# Patient Record
Sex: FEMALE | Race: WHITE | HISPANIC OR LATINO | URBAN - METROPOLITAN AREA
[De-identification: names, ages, dates, MRNs, and addresses within clinical notes are randomized per-mention and may not be internally consistent; named-entity substitution may affect disease eponyms.]

---

## 2024-07-19 ENCOUNTER — OUTPATIENT (OUTPATIENT)
Dept: OUTPATIENT SERVICES | Facility: HOSPITAL | Age: 34
LOS: 1 days | End: 2024-07-19
Payer: MEDICAID

## 2024-07-19 DIAGNOSIS — O09.899 SUPERVISION OF OTHER HIGH RISK PREGNANCIES, UNSPECIFIED TRIMESTER: ICD-10-CM

## 2024-07-19 LAB
HCT VFR BLD CALC: 39.3 % — SIGNIFICANT CHANGE UP (ref 34.5–45)
HGB BLD-MCNC: 13 G/DL — SIGNIFICANT CHANGE UP (ref 11.5–15.5)
MCHC RBC-ENTMCNC: 29.9 PG — SIGNIFICANT CHANGE UP (ref 27–34)
MCHC RBC-ENTMCNC: 33.1 GM/DL — SIGNIFICANT CHANGE UP (ref 32–36)
MCV RBC AUTO: 90.3 FL — SIGNIFICANT CHANGE UP (ref 80–100)
PLATELET # BLD AUTO: 266 K/UL — SIGNIFICANT CHANGE UP (ref 150–400)
RBC # BLD: 4.35 M/UL — SIGNIFICANT CHANGE UP (ref 3.8–5.2)
RBC # FLD: 11.9 % — SIGNIFICANT CHANGE UP (ref 10.3–14.5)
T4 FREE SERPL-MCNC: 1.3 NG/DL — SIGNIFICANT CHANGE UP (ref 0.9–1.8)
TSH RECEP AB FLD-ACNC: 1.13 IU/L — SIGNIFICANT CHANGE UP
TSH SERPL-MCNC: 0.02 UIU/ML — LOW (ref 0.27–4.2)
WBC # BLD: 11.82 K/UL — HIGH (ref 3.8–10.5)
WBC # FLD AUTO: 11.82 K/UL — HIGH (ref 3.8–10.5)

## 2024-07-19 PROCEDURE — 81003 URINALYSIS AUTO W/O SCOPE: CPT

## 2024-07-19 PROCEDURE — 87653 STREP B DNA AMP PROBE: CPT

## 2024-07-19 PROCEDURE — 86780 TREPONEMA PALLIDUM: CPT

## 2024-07-19 PROCEDURE — 84439 ASSAY OF FREE THYROXINE: CPT

## 2024-07-19 PROCEDURE — 83520 IMMUNOASSAY QUANT NOS NONAB: CPT

## 2024-07-19 PROCEDURE — G0463: CPT

## 2024-07-19 PROCEDURE — 84443 ASSAY THYROID STIM HORMONE: CPT

## 2024-07-19 PROCEDURE — 84445 ASSAY OF TSI GLOBULIN: CPT

## 2024-07-19 PROCEDURE — 36415 COLL VENOUS BLD VENIPUNCTURE: CPT

## 2024-07-19 PROCEDURE — 87389 HIV-1 AG W/HIV-1&-2 AB AG IA: CPT

## 2024-07-19 PROCEDURE — 85027 COMPLETE CBC AUTOMATED: CPT

## 2024-07-20 LAB
GROUP B BETA STREP DNA (PCR): DETECTED
HIV 1+2 AB+HIV1 P24 AG SERPL QL IA: SIGNIFICANT CHANGE UP
SOURCE GROUP B STREP: SIGNIFICANT CHANGE UP

## 2024-07-21 LAB
T PALLIDUM AB TITR SER: NEGATIVE — SIGNIFICANT CHANGE UP
TSI ACT/NOR SER: 0.73 IU/L — HIGH (ref 0–0.55)

## 2024-08-02 ENCOUNTER — OUTPATIENT (OUTPATIENT)
Dept: OUTPATIENT SERVICES | Facility: HOSPITAL | Age: 34
LOS: 1 days | End: 2024-08-02
Payer: MEDICAID

## 2024-08-02 DIAGNOSIS — O99.891 OTHER SPECIFIED DISEASES AND CONDITIONS COMPLICATING PREGNANCY: ICD-10-CM

## 2024-08-02 DIAGNOSIS — O09.90 SUPERVISION OF HIGH RISK PREGNANCY, UNSPECIFIED, UNSPECIFIED TRIMESTER: ICD-10-CM

## 2024-08-02 DIAGNOSIS — O09.899 SUPERVISION OF OTHER HIGH RISK PREGNANCIES, UNSPECIFIED TRIMESTER: ICD-10-CM

## 2024-08-02 DIAGNOSIS — O10.919 UNSPECIFIED PRE-EXISTING HYPERTENSION COMPLICATING PREGNANCY, UNSPECIFIED TRIMESTER: ICD-10-CM

## 2024-08-02 DIAGNOSIS — O35.8XX0 MATERNAL CARE FOR OTHER (SUSPECTED) FETAL ABNORMALITY AND DAMAGE, NOT APPLICABLE OR UNSPECIFIED: ICD-10-CM

## 2024-08-02 PROCEDURE — G0463: CPT

## 2024-08-02 PROCEDURE — 81003 URINALYSIS AUTO W/O SCOPE: CPT

## 2024-08-04 ENCOUNTER — OUTPATIENT (OUTPATIENT)
Dept: OUTPATIENT SERVICES | Facility: HOSPITAL | Age: 34
LOS: 1 days | End: 2024-08-04
Payer: MEDICAID

## 2024-08-04 DIAGNOSIS — O26.899 OTHER SPECIFIED PREGNANCY RELATED CONDITIONS, UNSPECIFIED TRIMESTER: ICD-10-CM

## 2024-08-04 PROCEDURE — G0463: CPT

## 2024-08-04 PROCEDURE — 83986 ASSAY PH BODY FLUID NOS: CPT

## 2024-08-04 PROCEDURE — 99202 OFFICE O/P NEW SF 15 MIN: CPT | Mod: 25,GC

## 2024-08-05 ENCOUNTER — INPATIENT (INPATIENT)
Facility: HOSPITAL | Age: 34
LOS: 1 days | Discharge: HOME | End: 2024-08-07
Attending: OBSTETRICS & GYNECOLOGY | Admitting: OBSTETRICS & GYNECOLOGY
Payer: MEDICAID

## 2024-08-05 VITALS — OXYGEN SATURATION: 97 % | HEART RATE: 75 BPM | DIASTOLIC BLOOD PRESSURE: 67 MMHG | SYSTOLIC BLOOD PRESSURE: 120 MMHG

## 2024-08-05 VITALS — SYSTOLIC BLOOD PRESSURE: 132 MMHG | DIASTOLIC BLOOD PRESSURE: 75 MMHG | HEART RATE: 75 BPM

## 2024-08-05 VITALS — SYSTOLIC BLOOD PRESSURE: 116 MMHG | DIASTOLIC BLOOD PRESSURE: 62 MMHG

## 2024-08-05 DIAGNOSIS — Z34.80 ENCOUNTER FOR SUPERVISION OF OTHER NORMAL PREGNANCY, UNSPECIFIED TRIMESTER: ICD-10-CM

## 2024-08-05 DIAGNOSIS — O26.899 OTHER SPECIFIED PREGNANCY RELATED CONDITIONS, UNSPECIFIED TRIMESTER: ICD-10-CM

## 2024-08-05 LAB
BASOPHILS # BLD AUTO: 0.03 K/UL — SIGNIFICANT CHANGE UP (ref 0–0.2)
BASOPHILS NFR BLD AUTO: 0.3 % — SIGNIFICANT CHANGE UP (ref 0–2)
BLD GP AB SCN SERPL QL: NEGATIVE — SIGNIFICANT CHANGE UP
EOSINOPHIL # BLD AUTO: 0.04 K/UL — SIGNIFICANT CHANGE UP (ref 0–0.5)
EOSINOPHIL NFR BLD AUTO: 0.4 % — SIGNIFICANT CHANGE UP (ref 0–6)
GLUCOSE BLDC GLUCOMTR-MCNC: 106 MG/DL — HIGH (ref 70–99)
GLUCOSE BLDC GLUCOMTR-MCNC: 81 MG/DL — SIGNIFICANT CHANGE UP (ref 70–99)
HCT VFR BLD CALC: 35.1 % — SIGNIFICANT CHANGE UP (ref 34.5–45)
HGB BLD-MCNC: 12.3 G/DL — SIGNIFICANT CHANGE UP (ref 11.5–15.5)
IMM GRANULOCYTES NFR BLD AUTO: 0.5 % — SIGNIFICANT CHANGE UP (ref 0–0.9)
LYMPHOCYTES # BLD AUTO: 2.33 K/UL — SIGNIFICANT CHANGE UP (ref 1–3.3)
LYMPHOCYTES # BLD AUTO: 24.7 % — SIGNIFICANT CHANGE UP (ref 13–44)
MCHC RBC-ENTMCNC: 30.4 PG — SIGNIFICANT CHANGE UP (ref 27–34)
MCHC RBC-ENTMCNC: 35 GM/DL — SIGNIFICANT CHANGE UP (ref 32–36)
MCV RBC AUTO: 86.7 FL — SIGNIFICANT CHANGE UP (ref 80–100)
MONOCYTES # BLD AUTO: 0.66 K/UL — SIGNIFICANT CHANGE UP (ref 0–0.9)
MONOCYTES NFR BLD AUTO: 7 % — SIGNIFICANT CHANGE UP (ref 2–14)
NEUTROPHILS # BLD AUTO: 6.32 K/UL — SIGNIFICANT CHANGE UP (ref 1.8–7.4)
NEUTROPHILS NFR BLD AUTO: 67.1 % — SIGNIFICANT CHANGE UP (ref 43–77)
NRBC # BLD: 0 /100 WBCS — SIGNIFICANT CHANGE UP (ref 0–0)
PLATELET # BLD AUTO: 212 K/UL — SIGNIFICANT CHANGE UP (ref 150–400)
RBC # BLD: 4.05 M/UL — SIGNIFICANT CHANGE UP (ref 3.8–5.2)
RBC # FLD: 11.9 % — SIGNIFICANT CHANGE UP (ref 10.3–14.5)
RH IG SCN BLD-IMP: POSITIVE — SIGNIFICANT CHANGE UP
WBC # BLD: 9.43 K/UL — SIGNIFICANT CHANGE UP (ref 3.8–10.5)
WBC # FLD AUTO: 9.43 K/UL — SIGNIFICANT CHANGE UP (ref 3.8–10.5)

## 2024-08-05 PROCEDURE — 99223 1ST HOSP IP/OBS HIGH 75: CPT

## 2024-08-05 RX ORDER — DEXTROSE MONOHYDRATE, SODIUM CHLORIDE, SODIUM LACTATE, CALCIUM CHLORIDE, MAGNESIUM CHLORIDE 1.5; 538; 448; 18.4; 5.08 G/100ML; MG/100ML; MG/100ML; MG/100ML; MG/100ML
1000 SOLUTION INTRAPERITONEAL
Refills: 0 | Status: DISCONTINUED | OUTPATIENT
Start: 2024-08-05 | End: 2024-08-06

## 2024-08-05 RX ORDER — AMPICILLIN 1 G/1
1 INJECTION, POWDER, FOR SOLUTION INTRAMUSCULAR; INTRAVENOUS EVERY 4 HOURS
Refills: 0 | Status: DISCONTINUED | OUTPATIENT
Start: 2024-08-05 | End: 2024-08-06

## 2024-08-05 RX ORDER — OXYTOCIN/RINGER'S LACTATE 20/1000 ML
333.33 PLASTIC BAG, INJECTION (ML) INTRAVENOUS
Qty: 20 | Refills: 0 | Status: COMPLETED | OUTPATIENT
Start: 2024-08-05 | End: 2024-08-06

## 2024-08-05 RX ORDER — AMPICILLIN 1 G/1
2 INJECTION, POWDER, FOR SOLUTION INTRAMUSCULAR; INTRAVENOUS ONCE
Refills: 0 | Status: COMPLETED | OUTPATIENT
Start: 2024-08-05 | End: 2024-08-05

## 2024-08-05 RX ORDER — TRISODIUM CITRATE DIHYDRATE AND CITRIC ACID MONOHYDRATE 500; 334 MG/5ML; MG/5ML
15 SOLUTION ORAL EVERY 6 HOURS
Refills: 0 | Status: DISCONTINUED | OUTPATIENT
Start: 2024-08-05 | End: 2024-08-06

## 2024-08-05 RX ORDER — BACTERIOSTATIC SODIUM CHLORIDE 0.9 %
1000 VIAL (ML) INJECTION
Refills: 0 | Status: DISCONTINUED | OUTPATIENT
Start: 2024-08-05 | End: 2024-08-06

## 2024-08-05 RX ORDER — CHLORHEXIDINE GLUCONATE 500 MG/1
1 CLOTH TOPICAL DAILY
Refills: 0 | Status: DISCONTINUED | OUTPATIENT
Start: 2024-08-05 | End: 2024-08-06

## 2024-08-05 RX ADMIN — DEXTROSE MONOHYDRATE, SODIUM CHLORIDE, SODIUM LACTATE, CALCIUM CHLORIDE, MAGNESIUM CHLORIDE 125 MILLILITER(S): 1.5; 538; 448; 18.4; 5.08 SOLUTION INTRAPERITONEAL at 23:40

## 2024-08-05 RX ADMIN — AMPICILLIN 200 GRAM(S): 1 INJECTION, POWDER, FOR SOLUTION INTRAMUSCULAR; INTRAVENOUS at 20:18

## 2024-08-05 NOTE — OB PROVIDER TRIAGE NOTE - NSHPPHYSICALEXAM_GEN_ALL_CORE
T(C): 36.9 (08-05-24 @ 00:13), Max: 36.9 (08-05-24 @ 00:13)  HR: 75 (08-05-24 @ 01:14) (74 - 88)  BP: 120/67 (08-05-24 @ 01:14) (120/67 - 132/75)  RR: 18 (08-05-24 @ 00:13) (18 - 18)  SpO2: 97% (08-05-24 @ 01:14) (97% - 100%)  Gen: NAD, well-appearing   Abd: Soft, gravid  SVE: 2/70/-3, -pooling, -nitrazine, -ferning   Bedside sono: cephalic, ALEJANDRO 11 cm  FHT: baseline 135, moderate variability, +accels, -decels  [place]: ctx q8-10 min    Exam performed by Dr. Leon, PGY-3

## 2024-08-05 NOTE — OB PROVIDER H&P - NS ATTEND AMEND GEN_ALL_CORE FT
ATTG on service note    Pt interviewed at the bedside.   chart reviewed.    Pt is a 33 yo  @ 38.3 wks presents back in triage for worsening ctxs.  Pt seen earlier for r/o ROM/ROL and found to be intact and 2cm so pt dc to home.  Pain worsened throughout the course of the day.  Intact.    PNC-AMA on bASA, h/o  @ 21 wks for PPROM, Hyperthyroidism s/p PTU/Methamizaole w/ nl TFTs, GDMA1    FS-106  T(C): 36.8 (24 @ 20:39), Max: 36.9 (24 @ 00:13)  HR: 65 (24 @ 21:43) (65 - 94)  BP: 113/67 (24 @ 21:43) (109/64 - 132/75)  RR: 15 (24 @ 20:39) (15 - 18)  SpO2: 100% (24 @ 21:43) (92% - 100%)    VE-5/80/-2  FHT-baseline 135, moderate variability, no decels, +accels  toco-q 6 min  EFW-508115 gms (extrapolated from u/s on  of 2941 gms (64%)  Pelvis tested to 7lbs 3 oz  GBS pos    Labs   CBC-9.4/12.3/35.1/212  Hep B sAg- nr  HIV - nr  MBT-O+    a/p:33 yo  @ 38.3 wks presents for painful ctxs and has made cervical change.  Cat 1 FHT.  GBS pos.  h/o GDMA1 and Hyperthyroid - no meds.  AGA.  -admit to LnD for expectant mngt   -h/p GDMA1 - FS q 4h and then q2h in active labor with rotating fluids  -epidural for pain mngt  -GBS pos - start Amp  -moderate PPH risk for AMA  -SCD for DVT ppx  -girl fetus  -labs reviewed and no further action required at this time  -Depo for contraception  -anticipate   -h/o Hyperthyroidism - no meds - Endo f/u pp in Oct    DUC Campbell

## 2024-08-05 NOTE — OB PROVIDER H&P - ASSESSMENT
33yo  @ 38w3d KAR  here with worsening contraction pain, found to be in labor, made cervical change from 2cm to 4-5cm/80/-2. Fetal status reassuring with Cat I tracing. GBS positive, will need prophylaxis. Maternal vitals stable. Will admit for labor management.     Maternal   - Admit to L&D  - Admission labs: CBC, RPR, T&S  - for expectant management  - anesthesia consult prn for epidural placement   - elevated PPH risk due to ama  - desires Depo provera for PP contraception     GDMA1  - NPO, diabetic IVF  - FS q4h in latent labor, q1h while in active labor  - insulin GTT prn elevated FS    Fetal  - cEFM/toco, currently Cat I   - ampicillin for GBS prophylaxis     Discussed with Dr. Campbell

## 2024-08-05 NOTE — OB RN TRIAGE NOTE - COMFORT/ACCEPTABLE PAIN LEVEL (0-10)
2 Erythromycin Counseling:  I discussed with the patient the risks of erythromycin including but not limited to GI upset, allergic reaction, drug rash, diarrhea, increase in liver enzymes, and yeast infections.

## 2024-08-05 NOTE — OB PROVIDER H&P - NSPRENATALGBS_OBGYN_ALL_OB_GET_DAYS
Patient needs a refill for Lisinopril, Flomax  Last RX 3/4/2022 QTY 90 with 3 additional refills  Was told to return to clinic in 1 year  Refilled RX x 4    
0

## 2024-08-05 NOTE — OB PROVIDER H&P - HISTORY OF PRESENT ILLNESS
OB Admission H&P    33yo  @ 38w3d, KAR  presents c/o worsening contraction pain. She was seen this AM and was found to be 70 and discharged home. Since pt with worsening pain. Denies LOF or VB. +FM. Last EFW 6lbs 8oz on .       PNC: HRC  AP Issues:  1-GDMA1  - FS well controlled  - growth as per above  2-poor OBHx  -  21w  i/s/o PPROM   3-GBS positive, NKDA    OBHx:    FT , M, 7lbs 3oz, no complications   PT  following IOL for PPROM, no complications   GynHx: denies abnormal Paps, STIs, cysts, fibroids   PMH: Graves Disease (follows with endo, was on methimazole 15mg, d/c'd due to normal TFTs, will f/up with endo PP)   PSH: denies  Meds: PNV, bASA  Allergies: NKDA  Social: denies alcohol/tobacco/drug use in pregnancy   Psych: denies anxiety/depression     Will accept blood transfusions: Yes

## 2024-08-05 NOTE — OB PROVIDER TRIAGE NOTE - ATTENDING COMMENTS
ATTG note    Pt seen at the bedside.      33 yo P1 @ 38.3 wks for r/o ROM/ROL.  Reports LOF x 2 at 10:30 pm and then abd pain.  VSS  EXam - no signs of ROM, neg pooling, neg nitrazine, ALEJANDRO - 11   VE-2/70  NST-reactive  toco-irreg    -NOt requesting pain meds.  Offered prolonged monitoring and rpt VE.  Pt declined.  No indication for IOL.   -Clinically stable to dc to home    DUC Campbell

## 2024-08-05 NOTE — OB RN TRIAGE NOTE - NSMATERNALFETALCONCERNS_OBGYN_ALL_OB_FT
Maternal Alert  This patient participates in the [employer] EFFECT study (PI Dr. Mcmahon). Please notify the MFM fellow (days) or night float OB resident (nights) upon admission for delivery.

## 2024-08-05 NOTE — PRE-ANESTHESIA EVALUATION ADULT - NSANTHNECKRD_ENT_A_CORE
Anesthesia Evaluation     Patient summary reviewed and Nursing notes reviewed   NPO Solid Status: > 8 hours  NPO Liquid Status: > 2 hours           Airway   Mallampati: II  TM distance: >3 FB  Neck ROM: full  Dental - normal exam     Pulmonary - normal exam   (+) a smoker Former,   Cardiovascular - normal exam        Neuro/Psych  (+) numbness,     GI/Hepatic/Renal/Endo      Musculoskeletal     (+) back pain, radiculopathy  Abdominal    Substance History      OB/GYN          Other                        Anesthesia Plan    ASA 2     MAC     Anesthetic plan, all risks, benefits, and alternatives have been provided, discussed and informed consent has been obtained with: patient.      
No
Yesica Moses(Attending)

## 2024-08-05 NOTE — OB PROVIDER TRIAGE NOTE - NSOBPROVIDERNOTE_OBGYN_ALL_OB_FT
A: 34y  at 38.3weeks GA presents to L&D for rule out rupture and rule out labor. Pt is not ruptured, -pooling, -nitrazine, -ferning. ALEJANDRO 11. CTX q8-10 min irregular. Exam /-3. Pt stable.       P:  -Pt is not ruptured  -Labor: Intact. Latent labor. Oren q8-10 min.  -Fetus: Cat I tracing.  -Offered to repeat SVE in 2 hours or send pt home now. Pt elects to go home.  -Return precautions discussed    Pt seen with Dr. Leon, PGY-3. Discussed with Dr. Campbell, attending.    Ruma Alvarez, PGY-1

## 2024-08-05 NOTE — OB PROVIDER H&P - NSHPPHYSICALEXAM_GEN_ALL_CORE
Vital Signs Last 24 Hrs  T(C): 36.8 (05 Aug 2024 19:10), Max: 36.9 (05 Aug 2024 00:13)  T(F): 98.2 (05 Aug 2024 19:10), Max: 98.4 (05 Aug 2024 00:13)  HR: 89 (05 Aug 2024 20:13) (74 - 94)  BP: 131/80 (05 Aug 2024 20:09) (116/62 - 132/75)  RR: 16 (05 Aug 2024 19:10) (15 - 18)  SpO2: 97% (05 Aug 2024 20:13) (92% - 100%)    Gen: alert, NAD  Abd: gravid, soft, non-tender  Ext: wwp, no LE edema or pain bilaterally    SVE: 4-5/80/-2    EFM: baseline 130, mod variability, +accels, no decels  [place]: irregular contractions  BSUS: vertex  EFW: 3400g extrapolated from last US

## 2024-08-05 NOTE — OB RN PATIENT PROFILE - FALL HARM RISK - UNIVERSAL INTERVENTIONS
Bed in lowest position, wheels locked, appropriate side rails in place/Call bell, personal items and telephone in reach/Instruct patient to call for assistance before getting out of bed or chair/Non-slip footwear when patient is out of bed/[place] to call system/Physically safe environment - no spills, clutter or unnecessary equipment/Purposeful Proactive Rounding/Room/bathroom lighting operational, light cord in reach

## 2024-08-05 NOTE — OB RN TRIAGE NOTE - FALL HARM RISK - UNIVERSAL INTERVENTIONS
Bed in lowest position, wheels locked, appropriate side rails in place/Call bell, personal items and telephone in reach/Instruct patient to call for assistance before getting out of bed or chair/Non-slip footwear when patient is out of bed/[place] to call system/Physically safe environment - no spills, clutter or unnecessary equipment/Purposeful Proactive Rounding/Room/bathroom lighting operational, light cord in reach

## 2024-08-05 NOTE — OB PROVIDER TRIAGE NOTE - HISTORY OF PRESENT ILLNESS
34y  at 38.3 weeks GA presents to L&D for rule out rupture and rule out labor. Patient states she felt trickling of clear fluid at 10:30pm (2 hours ago) when she went to the bathroom. It happened again at 11pm. Neither instance was a large gush of fluid. Patient also c/o pain/contractions that began at 11pm every 8-10 minutes. Patient denies vaginal bleeding. She endorses good fetal movement. Denies fevers, chills, nausea and vomiting. No other complaints at this time. Prenatal course is significant for: AMA, GDMA1.    Prenatal course uncomplicated.    KAR: 2024    POB: AMA,   FT 7 lb 3 oz,  PPROM 20w IUFD  PGYN: intrauterine synechiae, denies fibroids, ovarian cysts, STD hx, or abnormal PAPs   PMH: hyperthyroidism/Grave's disease (not currently on meds, previously on PTU 15mg stopped 4 months ago, asymptomatic)  PSH: Denies  SH: Denies EtOH, tobacco and illicit drug use during this pregnancy; feels safe at home   Psych Hx: denies hx of anxiety or depression  Meds: PNVs, ASA 81  Allergies: NKDA    EFW: 3400    GBS: positive

## 2024-08-05 NOTE — OB RN TRIAGE NOTE - PATIENT ON (OXYGEN DELIVERY METHOD)
Continue Regimen: .\\nSun protection daily \\n\\nMild Cleanser at night. \\nSmoothing Retinol 2X at night. Olu Cea Detail Level: Zone Continue Regimen: .\\nBiotin 20.000 mcg daily, every other month. \\nRotate shampoos. \\n- Ketoconazole 2% shampoo twice weekly \\n-Regular Shampoo \\n\\nClobetasol 0.05 % scalp solution. Use it once every 2 weeks. room air

## 2024-08-06 DIAGNOSIS — O09.213 SUPERVISION OF PREGNANCY WITH HISTORY OF PRE-TERM LABOR, THIRD TRIMESTER: ICD-10-CM

## 2024-08-06 DIAGNOSIS — O24.410 GESTATIONAL DIABETES MELLITUS IN PREGNANCY, DIET CONTROLLED: ICD-10-CM

## 2024-08-06 DIAGNOSIS — E05.90 THYROTOXICOSIS, UNSPECIFIED WITHOUT THYROTOXIC CRISIS OR STORM: ICD-10-CM

## 2024-08-06 DIAGNOSIS — Z03.71 ENCOUNTER FOR SUSPECTED PROBLEM WITH AMNIOTIC CAVITY AND MEMBRANE RULED OUT: ICD-10-CM

## 2024-08-06 DIAGNOSIS — O47.1 FALSE LABOR AT OR AFTER 37 COMPLETED WEEKS OF GESTATION: ICD-10-CM

## 2024-08-06 DIAGNOSIS — O09.523 SUPERVISION OF ELDERLY MULTIGRAVIDA, THIRD TRIMESTER: ICD-10-CM

## 2024-08-06 DIAGNOSIS — O99.283 ENDOCRINE, NUTRITIONAL AND METABOLIC DISEASES COMPLICATING PREGNANCY, THIRD TRIMESTER: ICD-10-CM

## 2024-08-06 DIAGNOSIS — Z3A.38 38 WEEKS GESTATION OF PREGNANCY: ICD-10-CM

## 2024-08-06 DIAGNOSIS — O09.293 SUPERVISION OF PREGNANCY WITH OTHER POOR REPRODUCTIVE OR OBSTETRIC HISTORY, THIRD TRIMESTER: ICD-10-CM

## 2024-08-06 LAB
GLUCOSE BLDC GLUCOMTR-MCNC: 74 MG/DL — SIGNIFICANT CHANGE UP (ref 70–99)
T PALLIDUM AB TITR SER: NEGATIVE — SIGNIFICANT CHANGE UP

## 2024-08-06 PROCEDURE — 59409 OBSTETRICAL CARE: CPT | Mod: U9

## 2024-08-06 RX ORDER — ACETAMINOPHEN 500 MG
975 TABLET ORAL
Refills: 0 | Status: DISCONTINUED | OUTPATIENT
Start: 2024-08-06 | End: 2024-08-07

## 2024-08-06 RX ORDER — IBUPROFEN 200 MG
600 TABLET ORAL EVERY 6 HOURS
Refills: 0 | Status: COMPLETED | OUTPATIENT
Start: 2024-08-06 | End: 2025-07-05

## 2024-08-06 RX ORDER — SIMETHICONE 125 MG/1
80 TABLET, CHEWABLE ORAL EVERY 4 HOURS
Refills: 0 | Status: DISCONTINUED | OUTPATIENT
Start: 2024-08-06 | End: 2024-08-07

## 2024-08-06 RX ORDER — MAGNESIUM HYDROXIDE 400 MG/5ML
30 SUSPENSION, ORAL (FINAL DOSE FORM) ORAL
Refills: 0 | Status: DISCONTINUED | OUTPATIENT
Start: 2024-08-06 | End: 2024-08-07

## 2024-08-06 RX ORDER — DIBUCAINE 1 %
1 OINTMENT (GRAM) TOPICAL EVERY 6 HOURS
Refills: 0 | Status: DISCONTINUED | OUTPATIENT
Start: 2024-08-06 | End: 2024-08-07

## 2024-08-06 RX ORDER — OXYCODONE HYDROCHLORIDE 30 MG/1
5 TABLET ORAL
Refills: 0 | Status: DISCONTINUED | OUTPATIENT
Start: 2024-08-06 | End: 2024-08-07

## 2024-08-06 RX ORDER — LANOLIN 100 %
1 OINTMENT (GRAM) TOPICAL EVERY 6 HOURS
Refills: 0 | Status: DISCONTINUED | OUTPATIENT
Start: 2024-08-06 | End: 2024-08-07

## 2024-08-06 RX ORDER — HYDROCORTISONE 1 %
1 CREAM (GRAM) TOPICAL EVERY 6 HOURS
Refills: 0 | Status: DISCONTINUED | OUTPATIENT
Start: 2024-08-06 | End: 2024-08-07

## 2024-08-06 RX ORDER — IBUPROFEN 200 MG
600 TABLET ORAL EVERY 6 HOURS
Refills: 0 | Status: DISCONTINUED | OUTPATIENT
Start: 2024-08-06 | End: 2024-08-07

## 2024-08-06 RX ORDER — OXYTOCIN/RINGER'S LACTATE 20/1000 ML
41.67 PLASTIC BAG, INJECTION (ML) INTRAVENOUS
Qty: 20 | Refills: 0 | Status: DISCONTINUED | OUTPATIENT
Start: 2024-08-06 | End: 2024-08-07

## 2024-08-06 RX ORDER — KETOROLAC TROMETHAMINE 10 MG
30 TABLET ORAL ONCE
Refills: 0 | Status: DISCONTINUED | OUTPATIENT
Start: 2024-08-06 | End: 2024-08-07

## 2024-08-06 RX ORDER — CLOSTRIDIUM TETANI TOXOID ANTIGEN (FORMALDEHYDE INACTIVATED), CORYNEBACTERIUM DIPHTHERIAE TOXOID ANTIGEN (FORMALDEHYDE INACTIVATED), BORDETELLA PERTUSSIS TOXOID ANTIGEN (GLUTARALDEHYDE INACTIVATED), BORDETELLA PERTUSSIS FILAMENTOUS HEMAGGLUTININ ANTIGEN (FORMALDEHYDE INACTIVATED), BORDETELLA PERTUSSIS PERTACTIN ANTIGEN, AND BORDETELLA PERTUSSIS FIMBRIAE 2/3 ANTIGEN 5; 2; 2.5; 5; 3; 5 [LF]/.5ML; [LF]/.5ML; UG/.5ML; UG/.5ML; UG/.5ML; UG/.5ML
0.5 INJECTION, SUSPENSION INTRAMUSCULAR ONCE
Refills: 0 | Status: DISCONTINUED | OUTPATIENT
Start: 2024-08-06 | End: 2024-08-07

## 2024-08-06 RX ORDER — DIPHENHYDRAMINE HCL 25 MG
25 CAPSULE ORAL EVERY 6 HOURS
Refills: 0 | Status: DISCONTINUED | OUTPATIENT
Start: 2024-08-06 | End: 2024-08-07

## 2024-08-06 RX ORDER — BACTERIOSTATIC SODIUM CHLORIDE 0.9 %
3 VIAL (ML) INJECTION EVERY 8 HOURS
Refills: 0 | Status: DISCONTINUED | OUTPATIENT
Start: 2024-08-06 | End: 2024-08-07

## 2024-08-06 RX ORDER — DIPHENHYDRAMINE HCL 25 MG
25 CAPSULE ORAL EVERY 6 HOURS
Refills: 0 | Status: COMPLETED | OUTPATIENT
Start: 2024-08-06 | End: 2025-07-05

## 2024-08-06 RX ORDER — OXYTOCIN/RINGER'S LACTATE 20/1000 ML
10 PLASTIC BAG, INJECTION (ML) INTRAVENOUS ONCE
Refills: 0 | Status: COMPLETED | OUTPATIENT
Start: 2024-08-06 | End: 2024-08-06

## 2024-08-06 RX ORDER — OXYCODONE HYDROCHLORIDE 30 MG/1
5 TABLET ORAL ONCE
Refills: 0 | Status: DISCONTINUED | OUTPATIENT
Start: 2024-08-06 | End: 2024-08-07

## 2024-08-06 RX ORDER — WITCH HAZEL 500 MG/1
1 CLOTH TOPICAL EVERY 4 HOURS
Refills: 0 | Status: DISCONTINUED | OUTPATIENT
Start: 2024-08-06 | End: 2024-08-07

## 2024-08-06 RX ORDER — CRANBERRY FRUIT EXTRACT 650 MG
1 CAPSULE ORAL DAILY
Refills: 0 | Status: DISCONTINUED | OUTPATIENT
Start: 2024-08-06 | End: 2024-08-07

## 2024-08-06 RX ADMIN — Medication 600 MILLIGRAM(S): at 23:22

## 2024-08-06 RX ADMIN — Medication 1000 MILLIUNIT(S)/MIN: at 02:35

## 2024-08-06 RX ADMIN — Medication 1 TABLET(S): at 11:45

## 2024-08-06 RX ADMIN — Medication 600 MILLIGRAM(S): at 12:15

## 2024-08-06 RX ADMIN — Medication 975 MILLIGRAM(S): at 20:17

## 2024-08-06 RX ADMIN — Medication 25 MILLIGRAM(S): at 04:52

## 2024-08-06 RX ADMIN — Medication 975 MILLIGRAM(S): at 09:14

## 2024-08-06 RX ADMIN — Medication 10 UNIT(S): at 02:35

## 2024-08-06 RX ADMIN — Medication 975 MILLIGRAM(S): at 20:47

## 2024-08-06 RX ADMIN — Medication 600 MILLIGRAM(S): at 23:52

## 2024-08-06 RX ADMIN — Medication 975 MILLIGRAM(S): at 15:46

## 2024-08-06 RX ADMIN — Medication 975 MILLIGRAM(S): at 09:44

## 2024-08-06 RX ADMIN — Medication 600 MILLIGRAM(S): at 11:45

## 2024-08-06 RX ADMIN — AMPICILLIN 108 GRAM(S): 1 INJECTION, POWDER, FOR SOLUTION INTRAMUSCULAR; INTRAVENOUS at 00:10

## 2024-08-06 RX ADMIN — Medication 600 MILLIGRAM(S): at 17:25

## 2024-08-06 RX ADMIN — Medication 975 MILLIGRAM(S): at 16:16

## 2024-08-06 NOTE — OB RN DELIVERY SUMMARY - NSSELHIDDEN_OBGYN_ALL_OB_FT
[NS_DeliveryAttending1_OBGYN_ALL_OB_FT:[id number]],[NS_DeliveryAssist1_OBGYN_ALL_OB_FT:[id number]],[NS_DeliveryAssist2_OBGYN_ALL_OB_FT:[id number]=]

## 2024-08-06 NOTE — OB PROVIDER LABOR PROGRESS NOTE - NS_SUBJECTIVE/OBJECTIVE_OBGYN_ALL_OB_FT
Pt with rectal pressure.
S:  Pt seen and examined for irregular toco.    O: Pt comfortable in bed.  Vital Signs Last 24 Hrs  T(C): 36.5 (06 Aug 2024 00:12), Max: 36.8 (05 Aug 2024 19:01)  T(F): 97.7 (06 Aug 2024 00:12), Max: 98.24 (05 Aug 2024 19:01)  HR: 86 (06 Aug 2024 00:59) (64 - 94)  BP: 119/60 (06 Aug 2024 00:49) (98/56 - 131/80)  BP(mean): --  RR: 16 (06 Aug 2024 00:12) (15 - 18)  SpO2: 93% (06 Aug 2024 00:59) (90% - 100%)    Parameters below as of 05 Aug 2024 19:10  Patient On (Oxygen Delivery Method): room air
S: Pt seen and examined for cervical change.    O: Pt comfortable s/p epidural placement  Vital Signs Last 24 Hrs  T(C): 36.7 (05 Aug 2024 21:55), Max: 36.9 (05 Aug 2024 00:13)  T(F): 98.06 (05 Aug 2024 21:55), Max: 98.4 (05 Aug 2024 00:13)  HR: 81 (05 Aug 2024 23:33) (65 - 94)  BP: 98/56 (05 Aug 2024 23:33) (98/56 - 132/75)  BP(mean): --  RR: 18 (05 Aug 2024 21:55) (15 - 18)  SpO2: 99% (05 Aug 2024 23:33) (90% - 100%)    Parameters below as of 05 Aug 2024 19:10  Patient On (Oxygen Delivery Method): room air

## 2024-08-06 NOTE — OB PROVIDER DELIVERY SUMMARY - NSSELHIDDEN_OBGYN_ALL_OB_FT
[NS_DeliveryAttending1_OBGYN_ALL_OB_FT:[id number]],[NS_DeliveryAssist1_OBGYN_ALL_OB_FT:[id number]],[NS_DeliveryAssist2_OBGYN_ALL_OB_FT:[id number]=]

## 2024-08-06 NOTE — OB PROVIDER LABOR PROGRESS NOTE - ASSESSMENT
Pt is a 35yo  admitted for labor. VE exam unchanged.    - Reposition pt  - Continue cont EFM, toco, IVF  - Expect     Ruma Alvarez, PGY-1
A: 33yo  in active labor. Contractions have slowed. Cat I tracing.    P:  -AROM@11:30pm. Patient tolerated well, no complications.  -Epidural placed  -Position pt peanut ball  -Continue cont EFM, toco, IVF    Ruma Alvarez, PGY-1
P1 @ 38.3 wks in active labor  s/p AROM of clear fluid  Making cervical change.    -Cont expectant mngt   -Anticipate SARAH Campbell

## 2024-08-06 NOTE — OB RN DELIVERY SUMMARY - NS_SEPSISRSKCALC_OBGYN_ALL_OB_FT
EOS calculated successfully. EOS Risk Factor: 0.5/1000 live births ([facility] national incidence); GA=38w4d; Temp=98.24; ROM=3.017; GBS='Positive'; Antibiotics='GBS specific antibiotics > 2 hrs prior to birth'

## 2024-08-06 NOTE — OB RN DELIVERY SUMMARY - NSSKINTOSKINA_OBGYN_ALL_OB_END_DATE
Action Requested: Summary for Provider     []  Critical Lab, Recommendations Needed  [] Need Additional Advice  []   FYI    []   Need Orders  [] Need Medications Sent to Pharmacy  []  Other      call #[phone]    SUMMARY: Patient requesting appt t 06-Aug-2024 03:31

## 2024-08-06 NOTE — OB PROVIDER DELIVERY SUMMARY - NSLOWPPHRISK_OBGYN_A_OB
No previous uterine incision/[place] Pregnancy/Less than or equal to 4 previous vaginal births/No known bleeding disorder/No history of postpartum hemorrhage/No other PPH risks indicated

## 2024-08-06 NOTE — OB PROVIDER LABOR PROGRESS NOTE - NS_OBIHIFHRDETAILS_OBGYN_ALL_OB_FT
Baseline: 120 bpm, moderate variability,  + accels, - decels
baseline 120 bpm, moderate variability, +accels, -decels
Baseline 120, moderate variability, no decels + accels

## 2024-08-06 NOTE — OB PROVIDER DELIVERY SUMMARY - NSPROVIDERDELIVERYNOTE_OBGYN_ALL_OB_FT
Patient fully dilated and pushing.   of liveborn female infant.  Head, shoulders and body delivered easily in OP position. Loose nuchal x1, delivered through. Delayed cord clamping 60s.  Cord clamped and cut, infant to mother.  Placenta delivered intact.  Vaginal exam revealed intact cervix, vaginal walls, sulci.  First degree laceration identified and repaired in usual fashion with 2.0 vicryl suture.  Bimanual exam performed, with minimal clot evacuated. Fundus and lower uterine segment firm. Excellent hemostasis. IM pitocin given prophylactically. Count was correct x2.     Present with Dr. Figueroa, PGY-4 and Dr. Campbell, attending    Ruma Alvarez, PGY-1 Patient fully dilated and pushing.   of liveborn female infant.  Head, shoulders and body delivered easily in OP position. Loose nuchal x1, delivered through. Delayed cord clamping 60s.  Cord clamped cut, infant to mother.  Placenta delivered intact.  Vaginal exam revealed intact cervix, vaginal walls, sulci.  First degree laceration identified and repaired in usual fashion with 2.0 vicryl suture.  Bimanual exam performed, with minimal clot evacuated. Fundus and lower uterine segment firm. Excellent hemostasis. IM pitocin given prophylactically. Count was correct x2.     Present with Dr. Figueroa, PGY-4 and Dr. Campbell, attending    Ruma Alvarez, PGY-1    ATTG note    Pt FD/0 station with urge to push.  Pt s/p  of viable female infant.  Nuchal cord x 1 reducible.  Spontaneous delivery of placenta.  Pitocin IM and IV given.   FIrst degree laceration repaired with Vicryl suture.    EBL-100 cc    DUC Campbell

## 2024-08-07 VITALS
RESPIRATION RATE: 18 BRPM | HEART RATE: 73 BPM | SYSTOLIC BLOOD PRESSURE: 109 MMHG | DIASTOLIC BLOOD PRESSURE: 68 MMHG | TEMPERATURE: 98 F | OXYGEN SATURATION: 100 %

## 2024-08-07 LAB
HCT VFR BLD CALC: 34 % — LOW (ref 34.5–45)
HGB BLD-MCNC: 11.6 G/DL — SIGNIFICANT CHANGE UP (ref 11.5–15.5)

## 2024-08-07 PROCEDURE — 36415 COLL VENOUS BLD VENIPUNCTURE: CPT

## 2024-08-07 PROCEDURE — 85025 COMPLETE CBC W/AUTO DIFF WBC: CPT

## 2024-08-07 PROCEDURE — 85014 HEMATOCRIT: CPT

## 2024-08-07 PROCEDURE — 85018 HEMOGLOBIN: CPT

## 2024-08-07 PROCEDURE — 82962 GLUCOSE BLOOD TEST: CPT

## 2024-08-07 PROCEDURE — 86850 RBC ANTIBODY SCREEN: CPT

## 2024-08-07 PROCEDURE — 86780 TREPONEMA PALLIDUM: CPT

## 2024-08-07 PROCEDURE — 86901 BLOOD TYPING SEROLOGIC RH(D): CPT

## 2024-08-07 PROCEDURE — 86900 BLOOD TYPING SEROLOGIC ABO: CPT

## 2024-08-07 PROCEDURE — 59050 FETAL MONITOR W/REPORT: CPT

## 2024-08-07 RX ORDER — MEDROXYPROGESTERONE ACETATE 5 MG
150 TABLET ORAL ONCE
Refills: 0 | Status: COMPLETED | OUTPATIENT
Start: 2024-08-07 | End: 2024-08-07

## 2024-08-07 RX ORDER — ACETAMINOPHEN 500 MG
3 TABLET ORAL
Qty: 0 | Refills: 0 | DISCHARGE
Start: 2024-08-07

## 2024-08-07 RX ORDER — IBUPROFEN 200 MG
1 TABLET ORAL
Qty: 0 | Refills: 0 | DISCHARGE
Start: 2024-08-07

## 2024-08-07 RX ORDER — CRANBERRY FRUIT EXTRACT 650 MG
1 CAPSULE ORAL
Qty: 0 | Refills: 0 | DISCHARGE
Start: 2024-08-07

## 2024-08-07 RX ADMIN — Medication 150 MILLIGRAM(S): at 15:27

## 2024-08-07 RX ADMIN — Medication 600 MILLIGRAM(S): at 11:43

## 2024-08-07 RX ADMIN — Medication 1 TABLET(S): at 11:43

## 2024-08-07 RX ADMIN — Medication 600 MILLIGRAM(S): at 12:13

## 2024-08-07 RX ADMIN — Medication 975 MILLIGRAM(S): at 08:24

## 2024-08-07 RX ADMIN — Medication 975 MILLIGRAM(S): at 08:54

## 2024-08-07 NOTE — DISCHARGE NOTE OB - CARE PLAN
Principal Discharge DX:	Normal vaginal delivery  Assessment and plan of treatment:	Routine recovery   1

## 2024-08-07 NOTE — CHART NOTE - NSCHARTNOTEFT_GEN_A_CORE
Patient seen for: Nutrition consult for GDM postpartum education prior to discharge    Source: Patient, Electronic Medical Record  Pt declined [place]ian interpretation services at this time.    Patient is: ; GDMA1    Chart reviewed, events noted. Meds/Labs reviewed.    Anthropometrics:  Height: 6 feet 1 inches  Pre-pregnancy weight: 165 pounds   Weight gain: ~34 pounds   Admit/Dosing wt: 199.9 pounds     Nutrition Diagnosis:   Food and Nutrition Related Knowledge Deficit related to limited previous exposure to postpartum GDM nutrition education and recommendations as evidenced by GDM postpartum.    Goal: Pt to state at least two teach back points.    Intervention:  1. Education: Pt educated on postpartum dietary recommendations, including risk of development of T2DM; reinforced importance of DM screening 4-12 weeks postpartum. Reviewed nutrition recommendations for breast feeding, including adequate protein, calorie, and fluid intake.   2. Handout: After Delivery ([place]ian)    Monitoring:  No other nutrition risks were identified during this encounter.  RD remains available upon request and will follow up per protocol.    Dunia Candelario, MIRIAN, CDN  TEAMS

## 2024-08-07 NOTE — PROGRESS NOTE ADULT - ATTENDING COMMENTS
Obstetrical  8am-6pm:  Patient seen and examined by me.  Agree with above resident note.  Heather HOLT

## 2024-08-07 NOTE — DISCHARGE NOTE OB - PROVIDER TOKENS
FREE:[LAST:[[facility]],PHONE:[phone]],FAX:[(   )    -],ADDRESS:[[address] [place] [address]]]

## 2024-08-07 NOTE — DISCHARGE NOTE OB - CARE PROVIDER_API CALL
[facility],   [address] [address]  Phone: [phone]  Fax: (   )    -  Follow Up Time:

## 2024-08-07 NOTE — PROGRESS NOTE ADULT - SUBJECTIVE AND OBJECTIVE BOX
Patient seen and examined at bedside, no acute overnight events. No acute complaints, pain well controlled. Patient is ambulating, voiding spontaneously, passing gas, and tolerating regular diet. Denies CP, SOB, N/V, HA, blurred vision, epigastric pain.    Vital Signs Last 24 Hours  T(C): 36.4 (08-07-24 @ 05:18), Max: 37.1 (08-06-24 @ 21:00)  HR: 73 (08-07-24 @ 05:18) (71 - 74)  BP: 109/68 (08-07-24 @ 05:18) (100/56 - 111/71)  RR: 18 (08-07-24 @ 05:18) (18 - 18)  SpO2: 100% (08-07-24 @ 05:18) (97% - 100%)    Physical Exam:  General: NAD  Abdomen: Soft, non-tender, non-distended, fundus firm  Pelvic: Lochia wnl, external exam of perineum clean and dry without swelling    Labs:    Blood Type: O Positive  Antibody Screen: Negative  RPR: Negative               11.6   x     )-----------( x        ( 08-07 @ 06:50 )             34.0                12.3   9.43  )-----------( 212      ( 08-05 @ 20:38 )             35.1                13.0   11.82 )-----------( 266      ( 07-19 @ 19:15 )             39.3         MEDICATIONS  (STANDING):  acetaminophen     Tablet .. 975 milliGRAM(s) Oral <User Schedule>  diphtheria/tetanus/pertussis (acellular) Vaccine (Adacel) 0.5 milliLiter(s) IntraMuscular once  ibuprofen  Tablet. 600 milliGRAM(s) Oral every 6 hours  ketorolac   Injectable 30 milliGRAM(s) IV Push once  oxytocin Infusion 41.667 milliUNIT(s)/Min (125 mL/Hr) IV Continuous <Continuous>  prenatal multivitamin 1 Tablet(s) Oral daily  sodium chloride 0.9% lock flush 3 milliLiter(s) IV Push every 8 hours    MEDICATIONS  (PRN):  benzocaine 20%/menthol 0.5% Spray 1 Spray(s) Topical every 6 hours PRN for Perineal discomfort  dibucaine 1% Ointment 1 Application(s) Topical every 6 hours PRN Perineal discomfort  diphenhydrAMINE 25 milliGRAM(s) Oral every 6 hours PRN Pruritus  hydrocortisone 1% Cream 1 Application(s) Topical every 6 hours PRN Moderate Pain (4-6)  lanolin Ointment 1 Application(s) Topical every 6 hours PRN nipple soreness  magnesium hydroxide Suspension 30 milliLiter(s) Oral two times a day PRN Constipation  oxyCODONE    IR 5 milliGRAM(s) Oral every 3 hours PRN Moderate to Severe Pain (4-10)  oxyCODONE    IR 5 milliGRAM(s) Oral once PRN Moderate to Severe Pain (4-10)  pramoxine 1%/zinc 5% Cream 1 Application(s) Topical every 4 hours PRN Moderate Pain (4-6)  simethicone 80 milliGRAM(s) Chew every 4 hours PRN Gas  witch hazel Pads 1 Application(s) Topical every 4 hours PRN Perineal discomfort

## 2024-08-07 NOTE — DISCHARGE NOTE OB - ADDITIONAL INSTRUCTIONS
Make your follow-up appointment with your doctor in 6 weeks for a post-partum check. No heavy lifting, driving, or strenuous activity for 6 weeks. Nothing per vagina such as tampons, intercourse, douches, or tub baths for 6 weeks or until you see your doctor. Call your doctor with any signs and symptoms of infection such as fever, chills, nausea, or vomiting. Call your doctor if you're unable to tolerate food, increase in vaginal bleeding, or have difficulty urinating. Call your doctor if you have pain that is not relieved by your prescribed medications. Notify your doctor with any other concerns.   Call [phone] if you have any of these concerns in the next 6 weeks.

## 2024-08-07 NOTE — PROGRESS NOTE ADULT - ASSESSMENT
35y/o PPD#1 from . . Patient is currently in stable condition.    #Routine Postpartum Care  - Continue with PO analgesia  - Increase ambulation as tolerated   - Continue regular diet  - No labs    Sheryl Mantilla  PGY-1

## 2024-08-07 NOTE — DISCHARGE NOTE OB - PATIENT PORTAL LINK FT
You can access the FollowMyHealth Patient Portal offered by [facility] by registering at the following website: [email or web address]. By joining [employer]’s FollowMyHealth portal, you will also be able to view your health information using other applications (apps) compatible with our system.

## 2024-09-17 ENCOUNTER — OUTPATIENT (OUTPATIENT)
Dept: OUTPATIENT SERVICES | Facility: HOSPITAL | Age: 34
LOS: 1 days | End: 2024-09-17

## 2024-09-17 PROCEDURE — 82950 GLUCOSE TEST: CPT

## 2024-09-17 PROCEDURE — G0463: CPT

## 2024-09-17 PROCEDURE — 82947 ASSAY GLUCOSE BLOOD QUANT: CPT

## 2024-09-18 LAB
GLUCOSE 2H P MEAL BLD-MCNC: 133 MG/DL — SIGNIFICANT CHANGE UP
GLUCOSE P FAST SERPL-MCNC: 80 MG/DL — SIGNIFICANT CHANGE UP (ref 70–99)

## 2024-10-07 ENCOUNTER — APPOINTMENT (OUTPATIENT)
Dept: ENDOCRINOLOGY | Facility: CLINIC | Age: 34
End: 2024-10-07
Payer: COMMERCIAL

## 2024-10-07 VITALS
BODY MASS INDEX: 24.38 KG/M2 | DIASTOLIC BLOOD PRESSURE: 70 MMHG | SYSTOLIC BLOOD PRESSURE: 110 MMHG | WEIGHT: 180 LBS | HEART RATE: 74 BPM | OXYGEN SATURATION: 96 % | HEIGHT: 72 IN

## 2024-10-07 DIAGNOSIS — E07.9 OTHER SPECIFIED DISORDERS OF EYE AND ADNEXA: ICD-10-CM

## 2024-10-07 DIAGNOSIS — E05.00 THYROTOXICOSIS WITH DIFFUSE GOITER W/OUT THYROTOXIC CRISIS OR STORM: ICD-10-CM

## 2024-10-07 DIAGNOSIS — H57.89 OTHER SPECIFIED DISORDERS OF EYE AND ADNEXA: ICD-10-CM

## 2024-10-07 PROCEDURE — G2211 COMPLEX E/M VISIT ADD ON: CPT | Mod: NC

## 2024-10-07 PROCEDURE — 99214 OFFICE O/P EST MOD 30 MIN: CPT

## 2024-10-08 LAB
T3 SERPL-MCNC: 168 NG/DL
T4 FREE SERPL-MCNC: 2.4 NG/DL
TSH SERPL-ACNC: 0.01 UIU/ML

## 2024-10-17 ENCOUNTER — APPOINTMENT (OUTPATIENT)
Dept: ULTRASOUND IMAGING | Facility: CLINIC | Age: 34
End: 2024-10-17

## 2024-10-17 PROCEDURE — 76536 US EXAM OF HEAD AND NECK: CPT

## 2024-11-07 ENCOUNTER — APPOINTMENT (OUTPATIENT)
Dept: OBGYN | Facility: CLINIC | Age: 34
End: 2024-11-07

## 2024-11-07 ENCOUNTER — OUTPATIENT (OUTPATIENT)
Dept: OUTPATIENT SERVICES | Facility: HOSPITAL | Age: 34
LOS: 1 days | End: 2024-11-07
Payer: COMMERCIAL

## 2024-11-07 DIAGNOSIS — Z30.42 ENCOUNTER FOR SURVEILLANCE OF INJECTABLE CONTRACEPTIVE: ICD-10-CM

## 2024-11-07 DIAGNOSIS — N76.0 ACUTE VAGINITIS: ICD-10-CM

## 2024-11-07 LAB
HCG UR QL: NEGATIVE
QUALITY CONTROL: YES

## 2024-11-07 PROCEDURE — 81025 URINE PREGNANCY TEST: CPT

## 2024-11-07 PROCEDURE — 90471 IMMUNIZATION ADMIN: CPT

## 2024-11-07 RX ORDER — MEDROXYPROGESTERONE ACETATE 150 MG/ML
150 INJECTION, SUSPENSION INTRAMUSCULAR
Qty: 0 | Refills: 0 | Status: COMPLETED | OUTPATIENT
Start: 2024-11-07

## 2024-11-07 RX ADMIN — MEDROXYPROGESTERONE ACETATE 150 MG/ML: 150 INJECTION, SUSPENSION, EXTENDED RELEASE INTRAMUSCULAR at 00:00

## 2024-11-12 ENCOUNTER — NON-APPOINTMENT (OUTPATIENT)
Age: 34
End: 2024-11-12

## 2024-11-21 DIAGNOSIS — Z30.42 ENCOUNTER FOR SURVEILLANCE OF INJECTABLE CONTRACEPTIVE: ICD-10-CM

## 2024-12-14 LAB
T3 SERPL-MCNC: >650 NG/DL
T4 FREE SERPL-MCNC: >7 NG/DL
TSH SERPL-ACNC: <0.01 UIU/ML

## 2024-12-16 RX ORDER — METHIMAZOLE 10 MG/1
10 TABLET ORAL
Qty: 270 | Refills: 1 | Status: ACTIVE | COMMUNITY
Start: 2024-12-16 | End: 1900-01-01

## 2024-12-16 RX ORDER — ATENOLOL 25 MG/1
25 TABLET ORAL
Qty: 90 | Refills: 0 | Status: ACTIVE | COMMUNITY
Start: 2024-12-16 | End: 1900-01-01

## 2025-01-15 ENCOUNTER — APPOINTMENT (OUTPATIENT)
Dept: ENDOCRINOLOGY | Facility: CLINIC | Age: 35
End: 2025-01-15
Payer: MEDICAID

## 2025-01-15 ENCOUNTER — NON-APPOINTMENT (OUTPATIENT)
Age: 35
End: 2025-01-15

## 2025-01-15 VITALS
WEIGHT: 170 LBS | HEIGHT: 72 IN | SYSTOLIC BLOOD PRESSURE: 118 MMHG | HEART RATE: 84 BPM | BODY MASS INDEX: 23.03 KG/M2 | OXYGEN SATURATION: 98 % | DIASTOLIC BLOOD PRESSURE: 64 MMHG

## 2025-01-15 DIAGNOSIS — E04.9 NONTOXIC GOITER, UNSPECIFIED: ICD-10-CM

## 2025-01-15 DIAGNOSIS — E05.00 THYROTOXICOSIS WITH DIFFUSE GOITER W/OUT THYROTOXIC CRISIS OR STORM: ICD-10-CM

## 2025-01-15 DIAGNOSIS — E05.90 THYROTOXICOSIS, UNSPECIFIED W/OUT THYROTOXIC CRISIS OR STORM: ICD-10-CM

## 2025-01-15 DIAGNOSIS — H57.89 OTHER SPECIFIED DISORDERS OF EYE AND ADNEXA: ICD-10-CM

## 2025-01-15 DIAGNOSIS — E07.9 OTHER SPECIFIED DISORDERS OF EYE AND ADNEXA: ICD-10-CM

## 2025-01-15 PROCEDURE — 99215 OFFICE O/P EST HI 40 MIN: CPT

## 2025-01-15 PROCEDURE — G2211 COMPLEX E/M VISIT ADD ON: CPT | Mod: NC

## 2025-01-30 ENCOUNTER — RESULT CHARGE (OUTPATIENT)
Age: 35
End: 2025-01-30

## 2025-01-30 ENCOUNTER — OUTPATIENT (OUTPATIENT)
Dept: OUTPATIENT SERVICES | Facility: HOSPITAL | Age: 35
LOS: 1 days | End: 2025-01-30
Payer: COMMERCIAL

## 2025-01-30 ENCOUNTER — APPOINTMENT (OUTPATIENT)
Dept: OBGYN | Facility: CLINIC | Age: 35
End: 2025-01-30

## 2025-01-30 DIAGNOSIS — N76.0 ACUTE VAGINITIS: ICD-10-CM

## 2025-01-30 LAB
HCG UR QL: NEGATIVE
QUALITY CONTROL: YES

## 2025-01-30 PROCEDURE — 81025 URINE PREGNANCY TEST: CPT

## 2025-01-30 PROCEDURE — 90471 IMMUNIZATION ADMIN: CPT

## 2025-01-30 PROCEDURE — 96372 THER/PROPH/DIAG INJ SC/IM: CPT

## 2025-01-30 RX ORDER — MEDROXYPROGESTERONE ACETATE 150 MG/ML
150 INJECTION, SUSPENSION INTRAMUSCULAR
Qty: 0 | Refills: 0 | Status: COMPLETED | OUTPATIENT
Start: 2025-01-30

## 2025-02-14 ENCOUNTER — APPOINTMENT (OUTPATIENT)
Dept: ENDOCRINOLOGY | Facility: CLINIC | Age: 35
End: 2025-02-14

## 2025-03-06 ENCOUNTER — APPOINTMENT (OUTPATIENT)
Dept: INTERNAL MEDICINE | Facility: CLINIC | Age: 35
End: 2025-03-06

## 2025-04-25 ENCOUNTER — APPOINTMENT (OUTPATIENT)
Dept: OBGYN | Facility: CLINIC | Age: 35
End: 2025-04-25

## 2025-05-13 ENCOUNTER — APPOINTMENT (OUTPATIENT)
Dept: OBGYN | Facility: CLINIC | Age: 35
End: 2025-05-13

## 2025-05-27 ENCOUNTER — APPOINTMENT (OUTPATIENT)
Dept: INTERNAL MEDICINE | Facility: CLINIC | Age: 35
End: 2025-05-27

## 2025-08-11 ENCOUNTER — APPOINTMENT (OUTPATIENT)
Dept: INTERNAL MEDICINE | Facility: CLINIC | Age: 35
End: 2025-08-11

## 2025-09-25 LAB
T3 SERPL-MCNC: 93 NG/DL
T4 FREE SERPL-MCNC: 1.4 NG/DL
TSH SERPL-ACNC: 0.48 UIU/ML